# Patient Record
Sex: FEMALE | URBAN - METROPOLITAN AREA
[De-identification: names, ages, dates, MRNs, and addresses within clinical notes are randomized per-mention and may not be internally consistent; named-entity substitution may affect disease eponyms.]

---

## 2023-07-28 ENCOUNTER — APPOINTMENT (RX ONLY)
Dept: URBAN - METROPOLITAN AREA CLINIC 55 | Facility: CLINIC | Age: 63
Setting detail: DERMATOLOGY
End: 2023-07-28

## 2023-07-28 VITALS — HEIGHT: 63 IN | WEIGHT: 147 LBS

## 2023-07-28 DIAGNOSIS — Z41.9 ENCOUNTER FOR PROCEDURE FOR PURPOSES OTHER THAN REMEDYING HEALTH STATE, UNSPECIFIED: ICD-10-CM

## 2023-07-28 PROCEDURE — ? INVENTORY

## 2023-07-28 PROCEDURE — ? COSMETIC CONSULTATION: COOLSCULPTING

## 2023-08-02 ENCOUNTER — APPOINTMENT (RX ONLY)
Dept: URBAN - METROPOLITAN AREA CLINIC 55 | Facility: CLINIC | Age: 63
Setting detail: DERMATOLOGY
End: 2023-08-02

## 2023-08-02 DIAGNOSIS — Z41.9 ENCOUNTER FOR PROCEDURE FOR PURPOSES OTHER THAN REMEDYING HEALTH STATE, UNSPECIFIED: ICD-10-CM

## 2023-08-02 PROCEDURE — ? COOLSCULPTING

## 2023-08-02 PROCEDURE — ? INVENTORY

## 2023-08-02 NOTE — PROCEDURE: COOLSCULPTING
Applicator Size: standard applicator
Time (Minutes - Will Only Render If Nonzero): 0
Applicator Size: CoolAdvantage Core
Intro: Prior to treatment, the area was cleaned with alcohol and marked out with a marking pen. The gel sheet was then applied uniformly. The applicator was applied to the skin with good contact.
Time (Minutes - Will Only Render If Nonzero): 45
Number Of Cycles: 1
Treatment Administered By (Optional): Anni
Applicator Size: CoolAdvantage Fit
Device: Coolsculpting
Time (Minutes - Will Only Render If Nonzero): 35
Post Treatment: After treatment, the applicator was removed from the skin and a two minute massage was performed according to protocol.
Detail Level: Zone
Applicator Size: Unicon PRO applicator
Suction Settings: The suction settings were per protocol.
Consent obtained, risks reviewed.
Time (Minutes - Will Only Render If Nonzero): 75
Location 1: chin
Patient Weight (Optional): 151.4
Applicator Size: CoolMini applicator

## 2023-08-03 ENCOUNTER — APPOINTMENT (RX ONLY)
Dept: URBAN - METROPOLITAN AREA CLINIC 55 | Facility: CLINIC | Age: 63
Setting detail: DERMATOLOGY
End: 2023-08-03

## 2023-08-03 VITALS — HEIGHT: 63 IN | WEIGHT: 151.4 LBS

## 2023-08-03 DIAGNOSIS — Z41.9 ENCOUNTER FOR PROCEDURE FOR PURPOSES OTHER THAN REMEDYING HEALTH STATE, UNSPECIFIED: ICD-10-CM

## 2023-08-03 PROCEDURE — ? INVENTORY

## 2023-08-03 PROCEDURE — ? COOLSCULPTING

## 2023-08-03 NOTE — PROCEDURE: COOLSCULPTING
Suction Settings: The suction settings were per protocol.
Time (Minutes - Will Only Render If Nonzero): 35
Location 3: left arm
Time (Minutes - Will Only Render If Nonzero): 0
Applicator Size: CoolAdvantage Fit
Intro: Prior to treatment, the area was cleaned with alcohol and marked out with a marking pen. The gel sheet was then applied uniformly. The applicator was applied to the skin with good contact.
Applicator Size: standard applicator
Number Of Cycles: 1
Location 8: left knee
Applicator Size: CoolAdvantage Core
Device: Coolsculpting
Applicator Size: CoolAdvantage Petite
Post Treatment: After treatment, the applicator was removed from the skin and a two minute massage was performed according to protocol.
Consent obtained, risks reviewed.
Detail Level: Zone
Suction Settings: The suction settings were per protocol
Location 5: lower abdomen (left)
Location 1: right arm
Location 6: lower abdomen (right)
Patient Weight (Optional): 151.4
Location 7: right knee
Treatment Administered By (Optional): Anni

## 2023-08-15 ENCOUNTER — APPOINTMENT (RX ONLY)
Dept: URBAN - METROPOLITAN AREA CLINIC 55 | Facility: CLINIC | Age: 63
Setting detail: DERMATOLOGY
End: 2023-08-15